# Patient Record
Sex: MALE | Race: BLACK OR AFRICAN AMERICAN | NOT HISPANIC OR LATINO | Employment: FULL TIME | URBAN - METROPOLITAN AREA
[De-identification: names, ages, dates, MRNs, and addresses within clinical notes are randomized per-mention and may not be internally consistent; named-entity substitution may affect disease eponyms.]

---

## 2021-01-15 ENCOUNTER — HOSPITAL ENCOUNTER (EMERGENCY)
Facility: HOSPITAL | Age: 31
Discharge: HOME | End: 2021-01-15
Attending: EMERGENCY MEDICINE
Payer: MEDICAID

## 2021-01-15 VITALS
HEIGHT: 74 IN | TEMPERATURE: 98 F | SYSTOLIC BLOOD PRESSURE: 139 MMHG | BODY MASS INDEX: 24 KG/M2 | WEIGHT: 187 LBS | DIASTOLIC BLOOD PRESSURE: 67 MMHG | OXYGEN SATURATION: 100 % | RESPIRATION RATE: 18 BRPM | HEART RATE: 87 BPM

## 2021-01-15 DIAGNOSIS — U07.1 COVID-19 VIRUS INFECTION: Primary | ICD-10-CM

## 2021-01-15 LAB
CTP QC/QA: YES
SARS-COV-2 RDRP RESP QL NAA+PROBE: POSITIVE

## 2021-01-15 PROCEDURE — 99283 EMERGENCY DEPT VISIT LOW MDM: CPT | Mod: CS,,, | Performed by: EMERGENCY MEDICINE

## 2021-01-15 PROCEDURE — U0002 COVID-19 LAB TEST NON-CDC: HCPCS | Performed by: EMERGENCY MEDICINE

## 2021-01-15 PROCEDURE — 99283 PR EMERGENCY DEPT VISIT,LEVEL III: ICD-10-PCS | Mod: CS,,, | Performed by: EMERGENCY MEDICINE

## 2021-01-15 PROCEDURE — 99283 EMERGENCY DEPT VISIT LOW MDM: CPT

## 2021-01-16 ENCOUNTER — TELEPHONE (OUTPATIENT)
Dept: ADMINISTRATIVE | Facility: CLINIC | Age: 31
End: 2021-01-16

## 2021-01-17 ENCOUNTER — TELEPHONE (OUTPATIENT)
Dept: ADMINISTRATIVE | Facility: CLINIC | Age: 31
End: 2021-01-17

## 2021-01-19 ENCOUNTER — INFUSION (OUTPATIENT)
Dept: INFECTIOUS DISEASES | Facility: HOSPITAL | Age: 31
End: 2021-01-19
Attending: EMERGENCY MEDICINE
Payer: MEDICAID

## 2021-01-19 VITALS
HEART RATE: 57 BPM | HEIGHT: 73 IN | OXYGEN SATURATION: 98 % | DIASTOLIC BLOOD PRESSURE: 80 MMHG | WEIGHT: 187 LBS | RESPIRATION RATE: 16 BRPM | TEMPERATURE: 99 F | BODY MASS INDEX: 24.78 KG/M2 | SYSTOLIC BLOOD PRESSURE: 136 MMHG

## 2021-01-19 DIAGNOSIS — U07.1 COVID-19 VIRUS INFECTION: ICD-10-CM

## 2021-01-19 PROCEDURE — 63600175 PHARM REV CODE 636 W HCPCS: Performed by: INTERNAL MEDICINE

## 2021-01-19 PROCEDURE — M0239 BAMLANIVIMAB-XXXX INFUSION: HCPCS | Performed by: INTERNAL MEDICINE

## 2021-01-19 PROCEDURE — 25000003 PHARM REV CODE 250: Performed by: INTERNAL MEDICINE

## 2021-01-19 RX ORDER — DIPHENHYDRAMINE HYDROCHLORIDE 50 MG/ML
25 INJECTION INTRAMUSCULAR; INTRAVENOUS ONCE AS NEEDED
Status: DISCONTINUED | OUTPATIENT
Start: 2021-01-19 | End: 2022-09-27

## 2021-01-19 RX ORDER — ALBUTEROL SULFATE 90 UG/1
2 AEROSOL, METERED RESPIRATORY (INHALATION)
Status: DISCONTINUED | OUTPATIENT
Start: 2021-01-19 | End: 2022-09-27

## 2021-01-19 RX ORDER — ONDANSETRON 4 MG/1
4 TABLET, ORALLY DISINTEGRATING ORAL ONCE AS NEEDED
Status: DISCONTINUED | OUTPATIENT
Start: 2021-01-19 | End: 2022-09-27

## 2021-01-19 RX ORDER — ACETAMINOPHEN 325 MG/1
650 TABLET ORAL ONCE AS NEEDED
Status: DISCONTINUED | OUTPATIENT
Start: 2021-01-19 | End: 2022-09-27

## 2021-01-19 RX ORDER — EPINEPHRINE 0.1 MG/ML
0.3 INJECTION INTRAVENOUS
Status: DISCONTINUED | OUTPATIENT
Start: 2021-01-19 | End: 2022-09-27

## 2021-01-19 RX ORDER — SODIUM CHLORIDE 0.9 % (FLUSH) 0.9 %
10 SYRINGE (ML) INJECTION
Status: DISCONTINUED | OUTPATIENT
Start: 2021-01-19 | End: 2022-09-27

## 2021-01-19 RX ADMIN — SODIUM CHLORIDE 700 MG: 0.9 INJECTION, SOLUTION INTRAVENOUS at 10:01

## 2021-02-03 ENCOUNTER — HOSPITAL ENCOUNTER (EMERGENCY)
Facility: HOSPITAL | Age: 31
Discharge: HOME | End: 2021-02-03
Attending: EMERGENCY MEDICINE
Payer: MEDICAID

## 2021-02-03 VITALS
RESPIRATION RATE: 18 BRPM | WEIGHT: 187 LBS | TEMPERATURE: 99 F | HEART RATE: 87 BPM | DIASTOLIC BLOOD PRESSURE: 81 MMHG | OXYGEN SATURATION: 100 % | HEIGHT: 74 IN | SYSTOLIC BLOOD PRESSURE: 134 MMHG | BODY MASS INDEX: 24 KG/M2

## 2021-02-03 DIAGNOSIS — Z76.89 RETURN TO WORK EXAM: Primary | ICD-10-CM

## 2021-02-03 PROCEDURE — 99283 EMERGENCY DEPT VISIT LOW MDM: CPT

## 2021-02-03 PROCEDURE — 99282 EMERGENCY DEPT VISIT SF MDM: CPT | Mod: CR,,, | Performed by: PHYSICIAN ASSISTANT

## 2021-02-03 PROCEDURE — 99282 PR EMERGENCY DEPT VISIT,LEVEL II: ICD-10-PCS | Mod: CR,,, | Performed by: PHYSICIAN ASSISTANT

## 2021-02-03 PROCEDURE — 86803 HEPATITIS C AB TEST: CPT

## 2021-02-03 PROCEDURE — 86703 HIV-1/HIV-2 1 RESULT ANTBDY: CPT

## 2021-02-04 LAB
HCV AB SERPL QL IA: NEGATIVE
HIV 1+2 AB+HIV1 P24 AG SERPL QL IA: NEGATIVE

## 2021-12-24 ENCOUNTER — HOSPITAL ENCOUNTER (EMERGENCY)
Facility: HOSPITAL | Age: 31
Discharge: HOME | End: 2021-12-24
Attending: EMERGENCY MEDICINE
Payer: MEDICAID

## 2021-12-24 VITALS
HEART RATE: 98 BPM | DIASTOLIC BLOOD PRESSURE: 56 MMHG | OXYGEN SATURATION: 98 % | RESPIRATION RATE: 19 BRPM | TEMPERATURE: 100 F | SYSTOLIC BLOOD PRESSURE: 127 MMHG

## 2021-12-24 DIAGNOSIS — R19.7 DIARRHEA, UNSPECIFIED TYPE: ICD-10-CM

## 2021-12-24 DIAGNOSIS — B34.9 VIRAL SYNDROME: Primary | ICD-10-CM

## 2021-12-24 LAB
ALBUMIN SERPL BCP-MCNC: 4.1 G/DL (ref 3.5–5.2)
ALP SERPL-CCNC: 81 U/L (ref 55–135)
ALT SERPL W/O P-5'-P-CCNC: 48 U/L (ref 10–44)
ANION GAP SERPL CALC-SCNC: 5 MMOL/L (ref 8–16)
AST SERPL-CCNC: 30 U/L (ref 10–40)
BASOPHILS # BLD AUTO: 0.03 K/UL (ref 0–0.2)
BASOPHILS NFR BLD: 0.4 % (ref 0–1.9)
BILIRUB SERPL-MCNC: 0.9 MG/DL (ref 0.1–1)
BILIRUB UR QL STRIP: NEGATIVE
BUN SERPL-MCNC: 9 MG/DL (ref 6–20)
CALCIUM SERPL-MCNC: 9.6 MG/DL (ref 8.7–10.5)
CHLORIDE SERPL-SCNC: 107 MMOL/L (ref 95–110)
CLARITY UR REFRACT.AUTO: CLEAR
CO2 SERPL-SCNC: 28 MMOL/L (ref 23–29)
COLOR UR AUTO: YELLOW
CREAT SERPL-MCNC: 1.2 MG/DL (ref 0.5–1.4)
CTP QC/QA: YES
CTP QC/QA: YES
DIFFERENTIAL METHOD: ABNORMAL
EOSINOPHIL # BLD AUTO: 0 K/UL (ref 0–0.5)
EOSINOPHIL NFR BLD: 0.5 % (ref 0–8)
ERYTHROCYTE [DISTWIDTH] IN BLOOD BY AUTOMATED COUNT: 12.7 % (ref 11.5–14.5)
EST. GFR  (AFRICAN AMERICAN): >60 ML/MIN/1.73 M^2
EST. GFR  (NON AFRICAN AMERICAN): >60 ML/MIN/1.73 M^2
GLUCOSE SERPL-MCNC: 91 MG/DL (ref 70–110)
GLUCOSE UR QL STRIP: NEGATIVE
HCT VFR BLD AUTO: 45 % (ref 40–54)
HGB BLD-MCNC: 14.9 G/DL (ref 14–18)
HGB UR QL STRIP: NEGATIVE
IMM GRANULOCYTES # BLD AUTO: 0.03 K/UL (ref 0–0.04)
IMM GRANULOCYTES NFR BLD AUTO: 0.4 % (ref 0–0.5)
KETONES UR QL STRIP: NEGATIVE
LEUKOCYTE ESTERASE UR QL STRIP: NEGATIVE
LIPASE SERPL-CCNC: 10 U/L (ref 4–60)
LYMPHOCYTES # BLD AUTO: 0.9 K/UL (ref 1–4.8)
LYMPHOCYTES NFR BLD: 10.8 % (ref 18–48)
MCH RBC QN AUTO: 30.5 PG (ref 27–31)
MCHC RBC AUTO-ENTMCNC: 33.1 G/DL (ref 32–36)
MCV RBC AUTO: 92 FL (ref 82–98)
MICROSCOPIC COMMENT: NORMAL
MONOCYTES # BLD AUTO: 0.6 K/UL (ref 0.3–1)
MONOCYTES NFR BLD: 7.7 % (ref 4–15)
NEUTROPHILS # BLD AUTO: 6.3 K/UL (ref 1.8–7.7)
NEUTROPHILS NFR BLD: 80.2 % (ref 38–73)
NITRITE UR QL STRIP: NEGATIVE
NRBC BLD-RTO: 0 /100 WBC
PH UR STRIP: 7 [PH] (ref 5–8)
PLATELET # BLD AUTO: 289 K/UL (ref 150–450)
PMV BLD AUTO: 10.5 FL (ref 9.2–12.9)
POC MOLECULAR INFLUENZA A AGN: NEGATIVE
POC MOLECULAR INFLUENZA B AGN: NEGATIVE
POTASSIUM SERPL-SCNC: 3.8 MMOL/L (ref 3.5–5.1)
PROT SERPL-MCNC: 7.4 G/DL (ref 6–8.4)
PROT UR QL STRIP: NEGATIVE
RBC # BLD AUTO: 4.89 M/UL (ref 4.6–6.2)
RBC #/AREA URNS AUTO: 1 /HPF (ref 0–4)
SARS-COV-2 RDRP RESP QL NAA+PROBE: NEGATIVE
SARS-COV-2 RNA RESP QL NAA+PROBE: DETECTED
SODIUM SERPL-SCNC: 140 MMOL/L (ref 136–145)
SP GR UR STRIP: 1.01 (ref 1–1.03)
URN SPEC COLLECT METH UR: NORMAL
WBC # BLD AUTO: 7.89 K/UL (ref 3.9–12.7)
WBC #/AREA URNS AUTO: 0 /HPF (ref 0–5)

## 2021-12-24 PROCEDURE — 96360 HYDRATION IV INFUSION INIT: CPT

## 2021-12-24 PROCEDURE — 81001 URINALYSIS AUTO W/SCOPE: CPT | Performed by: EMERGENCY MEDICINE

## 2021-12-24 PROCEDURE — 80053 COMPREHEN METABOLIC PANEL: CPT | Performed by: EMERGENCY MEDICINE

## 2021-12-24 PROCEDURE — 87502 INFLUENZA DNA AMP PROBE: CPT

## 2021-12-24 PROCEDURE — 99282 EMERGENCY DEPT VISIT SF MDM: CPT | Mod: CS,,, | Performed by: EMERGENCY MEDICINE

## 2021-12-24 PROCEDURE — U0005 INFEC AGEN DETEC AMPLI PROBE: HCPCS | Performed by: EMERGENCY MEDICINE

## 2021-12-24 PROCEDURE — 83690 ASSAY OF LIPASE: CPT | Performed by: EMERGENCY MEDICINE

## 2021-12-24 PROCEDURE — 85025 COMPLETE CBC W/AUTO DIFF WBC: CPT | Performed by: EMERGENCY MEDICINE

## 2021-12-24 PROCEDURE — 99282 PR EMERGENCY DEPT VISIT,LEVEL II: ICD-10-PCS | Mod: CS,,, | Performed by: EMERGENCY MEDICINE

## 2021-12-24 PROCEDURE — 25000003 PHARM REV CODE 250: Performed by: EMERGENCY MEDICINE

## 2021-12-24 PROCEDURE — 99284 EMERGENCY DEPT VISIT MOD MDM: CPT | Mod: 25

## 2021-12-24 PROCEDURE — U0002 COVID-19 LAB TEST NON-CDC: HCPCS | Performed by: EMERGENCY MEDICINE

## 2021-12-24 PROCEDURE — U0003 INFECTIOUS AGENT DETECTION BY NUCLEIC ACID (DNA OR RNA); SEVERE ACUTE RESPIRATORY SYNDROME CORONAVIRUS 2 (SARS-COV-2) (CORONAVIRUS DISEASE [COVID-19]), AMPLIFIED PROBE TECHNIQUE, MAKING USE OF HIGH THROUGHPUT TECHNOLOGIES AS DESCRIBED BY CMS-2020-01-R: HCPCS | Performed by: EMERGENCY MEDICINE

## 2021-12-24 RX ORDER — ACETAMINOPHEN 325 MG/1
650 TABLET ORAL
Status: COMPLETED | OUTPATIENT
Start: 2021-12-24 | End: 2021-12-24

## 2021-12-24 RX ADMIN — ACETAMINOPHEN 650 MG: 325 TABLET ORAL at 03:12

## 2021-12-24 RX ADMIN — SODIUM CHLORIDE 1000 ML: 0.9 INJECTION, SOLUTION INTRAVENOUS at 03:12

## 2021-12-24 NOTE — ED PROVIDER NOTES
CC: COVID-19 Concerns      History provided by:   Patient    HPI: Mal Wilburn is a 31 y.o. year old male who presents to the ED for body aches, sore throat, cough, diarrhea that started last night and got worse today.  He had chills and body aches, denies any fever, shortness of breath, chest pain, hematuria, penile discharge,  swelling or erythema, lower extremity swelling.  He reports 2 episodes of diarrhea today, nonbloody    His child was sick a few days ago with viral syndrome  He also ate nachos yesterday, his wife ate the same thing with no diarrhea or fever    Past medical history of asthma on albuterol p.r.n. however has not been using the medication in years  No known allergies to medications  No alcohol or smoking      No past medical history on file.  No past surgical history on file.  No family history on file.  Current Facility-Administered Medications on File Prior to Encounter   Medication Dose Route Frequency Provider Last Rate Last Admin    acetaminophen tablet 650 mg  650 mg Oral Once PRN Jonathon Harp MD        albuterol inhaler 2 puff  2 puff Inhalation Q20 Min PRN Jonathon Harp MD        diphenhydrAMINE injection 25 mg  25 mg Intravenous Once PRN Jonathon Harp MD        EPINEPHrine 0.1 mg/mL injection 0.3 mg  0.3 mg Intramuscular PRN Jonathon Harp MD        methylPREDNISolone sodium succinate injection 40 mg  40 mg Intravenous Once PRN Jonathon Harp MD        ondansetron disintegrating tablet 4 mg  4 mg Oral Once PRN Jonathon Harp MD        sodium chloride 0.9% 500 mL flush bag   Intravenous PRN Jonathon Harp MD        sodium chloride 0.9% flush 10 mL  10 mL Intravenous PRN Jonathon Harp MD         Current Outpatient Medications on File Prior to Encounter   Medication Sig Dispense Refill    pulse oximeter (PULSE OXIMETER) device by Apply Externally route 2 (two) times a day. Use twice daily at 8 AM and 3 PM and record the value in [employer]  as directed. 1 each 0     Patient has no known allergies.  Social History     Socioeconomic History    Marital status: Unknown       ROS:     Constitutional : neg for fever, neg for weakness  HENT neg for head injury, pos for sore throat  Eyes: neg for visual changes, neg for eye pain  Resp neg for SOB, pos for cough  Cardiac  neg for chest pain, neg for palpitations  GI pos for abd pain, neg for nausea, neg for vomiting, positive for diarrhea   neg for urinary changes  Neuro neg for focal weakness or numbness  Skin neg for skin rash  MSK:  Myalgia  ALL: Patient has no known allergies.    PHYSICAL EXAM:  Vitals:    12/24/21 1647   BP: (!) 127/56   Pulse: 98   Resp: 19   Temp: 99.8 °F (37.7 °C)     VS: triage VS reviewed    general: comfortable, in no acute distress, pleasant, well nourished  HENT: neck symmetric, trachea midline, post oropharynx symmetrical no edema no exudate  Eyes: no conjunctival injection and symmetrical eyelids  CV:tachycardic regular, no LE edema  Resp: comfortable breathing, speaks in full sentences, CTAB, no wheezing, no crackles, no ronchi  ABD:  soft, ND, no masses, + normal BS, NT  Renal: No CVAT  Neuro: AAO x 3, 5/5 strength x 4 extremities, sensation intact, face symmetric, speech normal  MSK: NC/AT, extremities w/out deformity   Skin: warm, dry            DATA & INTERVENTIONS:    LABS reviewed:  Labs Reviewed   COMPREHENSIVE METABOLIC PANEL - Abnormal; Notable for the following components:       Result Value    ALT 48 (*)     Anion Gap 5 (*)     All other components within normal limits   CBC W/ AUTO DIFFERENTIAL - Abnormal; Notable for the following components:    Lymph # 0.9 (*)     Gran % 80.2 (*)     Lymph % 10.8 (*)     All other components within normal limits   SARS-COV-2 (COVID-19) QUALITATIVE PCR - Abnormal; Notable for the following components:    SARS-CoV2 (COVID-19) Qualitative PCR Detected (*)     All other components within normal limits    Narrative:     Is the  patient symptomatic?->Yes  Is this needed for pre-procedure or pre-op testing?->No   LIPASE   URINALYSIS, REFLEX TO URINE CULTURE    Narrative:     Specimen Source->Urine   URINALYSIS MICROSCOPIC    Narrative:     Specimen Source->Urine   SARS-COV-2 RDRP GENE    Narrative:     This test utilizes isothermal nucleic acid amplification   technology to detect the SARS-CoV-2 RdRp nucleic acid segment.   The analytical sensitivity (limit of detection) is 125 genome   equivalents/mL.   A POSITIVE result implies infection with the SARS-CoV-2 virus;   the patient is presumed to be contagious.     A NEGATIVE result means that SARS-CoV-2 nucleic acids are not   present above the limit of detection. A NEGATIVE result should be   treated as presumptive. It does not rule out the possibility of   COVID-19 and should not be the sole basis for treatment decisions.   If COVID-19 is strongly suspected based on clinical and exposure   history, re-testing using an alternate molecular assay should be   considered.   This test is only for use under the Food and Drug   Administration s Emergency Use Authorization (EUA).   Commercial kits are provided by [employer].   Performance characteristics of the EUA have been independently   verified by [facility] Department of   Pathology and Laboratory Medicine.   _________________________________________________________________   The authorized Fact Sheet for Healthcare Providers and the authorized Fact   Sheet for Patients of the ID NOW COVID-19 are available on the FDA   website:     https://www.fda.gov/media/477413/download  https://www.fda.gov/media/028725/download       POCT INFLUENZA A/B MOLECULAR       RADIOLOGY reviewed:  Imaging Results    None         MEDICATIONS/FLUIDS:  Medications   acetaminophen tablet 650 mg (650 mg Oral Given 12/24/21 1520)   sodium chloride 0.9% bolus 1,000 mL (0 mLs Intravenous Stopped 12/24/21 1615)         MDM:  Mal Wilburn is a 31 y.o.  year old male who presents to the ED for body aches, chills, sore throat, cough, nonbloody diarrhea.  Symptoms suggestive of viral syndrome.  Patient tachycardic 115 in triage temperature a 100° F. point of care COVID negative patient had COVID last year in January, not vaccinated for flu or COVID.    Will test for flu also will send PCR testing for COVID in the light of increase recent cases of COVID  Unlikely appendicitis pancreatitis or cholecystitis, he does not localize pain on abdominal exam      Labs ordered and reviewed:   CBC normal white count, hemoglobin and platelets  CMP slight elevation in ALT of 48 otherwise no abnormalities  Lipase normal  UA negative for blood, nitrites and leukocytes  Point of COVID negative  Influenza negative  PCR COVID pending at time of discharge  Medication given in the ED:  Tylenol, IV fluids    PCR covid pending at discharge  Rpt vitals improved. Patient would like to go home. Stable for discharge    2:26 PM 12/25/2021  Patient called with the result of the Covid PCR test. Advised to start isolation, symptomatic management was also discussed    IMPRESSION:  1.) Viral syndrome  2.) Covid 19      Dispo: Discharge    Critical Care Time: N/A       Marlene Santana MD  12/25/21 3466

## 2021-12-24 NOTE — DISCHARGE INSTRUCTIONS
You can take tylenol and /or ibuprofen as needed for pain and fever  You can take robitussin as needed for cough  Return to the ED for chest pain, shortness of breath, confusion, inability to keep food and fluids down, oxygen saturation of 93 or below (if you have a pulse oximeter to monitor your oxygen level at home), worsening abdominal pain, or any other concerns

## 2021-12-24 NOTE — ED NOTES
Mal Wilburn, a 31 y.o. male presents to the ED w/ complaint of covid concerns. Pt reports SOB and nausea/abdominal pain. Denies chest pain.     Triage note:  Chief Complaint   Patient presents with    COVID-19 Concerns     Review of patient's allergies indicates:  No Known Allergies  No past medical history on file.

## 2021-12-24 NOTE — ED PROVIDER NOTES
Encounter Date: 12/24/2021       History     Chief Complaint   Patient presents with    COVID-19 Concerns     The history is provided by medical records and the patient. No  was used.      Mal Wilburn is a 31 y.o. male with no reported medical history presenting to the ED with the chief complaint of COVID-19 concerns.       Review of patient's allergies indicates:  No Known Allergies  No past medical history on file.  No past surgical history on file.  No family history on file.     Review of Systems    Physical Exam     Initial Vitals [12/24/21 1400]   BP Pulse Resp Temp SpO2   (!) 167/78 (!) 115 15 100 °F (37.8 °C) 99 %      MAP       --         Physical Exam    ED Course   Procedures  Labs Reviewed   SARS-COV-2 RDRP GENE    Narrative:     This test utilizes isothermal nucleic acid amplification   technology to detect the SARS-CoV-2 RdRp nucleic acid segment.   The analytical sensitivity (limit of detection) is 125 genome   equivalents/mL.   A POSITIVE result implies infection with the SARS-CoV-2 virus;   the patient is presumed to be contagious.     A NEGATIVE result means that SARS-CoV-2 nucleic acids are not   present above the limit of detection. A NEGATIVE result should be   treated as presumptive. It does not rule out the possibility of   COVID-19 and should not be the sole basis for treatment decisions.   If COVID-19 is strongly suspected based on clinical and exposure   history, re-testing using an alternate molecular assay should be   considered.   This test is only for use under the Food and Drug   Administration s Emergency Use Authorization (EUA).   Commercial kits are provided by [employer].   Performance characteristics of the EUA have been independently   verified by [facility] Department of   Pathology and Laboratory Medicine.   _________________________________________________________________   The authorized Fact Sheet for Healthcare Providers and the  "authorized Fact   Sheet for Patients of the ID NOW COVID-19 are available on the FDA   website:     https://www.fda.gov/media/643395/download  https://www.fda.gov/media/023986/download              Imaging Results    None          Medications - No data to display                       Clinical Impression:    ***Please document a Clinical Impression and click the "Refresh" button to refresh your note and automatically pull in before signing.***           "

## 2022-04-18 ENCOUNTER — OCCUPATIONAL HEALTH (OUTPATIENT)
Dept: URGENT CARE | Facility: CLINIC | Age: 32
End: 2022-04-18

## 2022-04-18 DIAGNOSIS — Z02.83 ENCOUNTER FOR DRUG SCREENING: Primary | ICD-10-CM

## 2022-04-18 LAB
CTP QC/QA: YES
POC 10 PANEL DRUG SCREEN: ABNORMAL

## 2022-04-18 PROCEDURE — 80305 DRUG TEST PRSMV DIR OPT OBS: CPT | Mod: S$GLB,,, | Performed by: EMERGENCY MEDICINE

## 2022-04-18 PROCEDURE — 80305 OOH NON-DOT DRUG SCREEN: ICD-10-PCS | Mod: S$GLB,,, | Performed by: EMERGENCY MEDICINE

## 2022-04-25 ENCOUNTER — TELEPHONE (OUTPATIENT)
Dept: URGENT CARE | Facility: CLINIC | Age: 32
End: 2022-04-25
Payer: MEDICAID

## 2022-09-27 ENCOUNTER — OFFICE VISIT (OUTPATIENT)
Dept: FAMILY MEDICINE | Facility: HOSPITAL | Age: 32
End: 2022-09-27
Payer: MEDICAID

## 2022-09-27 VITALS
DIASTOLIC BLOOD PRESSURE: 82 MMHG | HEIGHT: 74 IN | HEART RATE: 72 BPM | BODY MASS INDEX: 24.01 KG/M2 | SYSTOLIC BLOOD PRESSURE: 141 MMHG

## 2022-09-27 DIAGNOSIS — F45.9 ERECTILE DYSFUNCTION DUE TO PSYCHOPHYSIOLOGIC DISORDER: Primary | ICD-10-CM

## 2022-09-27 DIAGNOSIS — N52.1 ERECTILE DYSFUNCTION DUE TO PSYCHOPHYSIOLOGIC DISORDER: Primary | ICD-10-CM

## 2022-09-27 DIAGNOSIS — Z00.00 ENCOUNTER FOR MEDICAL EXAMINATION TO ESTABLISH CARE: ICD-10-CM

## 2022-09-27 PROCEDURE — 99213 OFFICE O/P EST LOW 20 MIN: CPT

## 2022-09-27 NOTE — PROGRESS NOTES
[facility]  History & Physical    SUBJECTIVE:     Chief Complaint:   Chief Complaint   Patient presents with    Establish Care       History of Present Illness:  31 y.o. male who  has no past medical history on file. presents to clinic today for establishing care.     #Erectile dysfunction  - Patient states over the last few weeks he has had difficulty achieving an erection with a new female partner. Denies this problem in the past. Endorses nocturnal penile tumescence, erections with masturbation and with other female partners. Denies dysuria, rash, hematuria, penile discharge, testicular pain.     Allergies:  Review of patient's allergies indicates:  No Known Allergies    Home Medications: none      No past medical history on file.  No past surgical history on file.  No family history on file.  Social History     Tobacco Use    Smoking status: Never    Smokeless tobacco: Never        ROS     OBJECTIVE:     Vital Signs:  Pulse: 72 (09/27/22 1432)  BP: (!) 141/82 (09/27/22 1432)    Physical Exam  Vitals and nursing note reviewed.   Constitutional:       General: He is not in acute distress.     Appearance: Normal appearance.   HENT:      Head: Normocephalic and atraumatic.      Right Ear: External ear normal.      Left Ear: External ear normal.      Nose: Nose normal.      Mouth/Throat:      Mouth: Mucous membranes are moist.   Eyes:      Extraocular Movements: Extraocular movements intact.      Conjunctiva/sclera: Conjunctivae normal.      Pupils: Pupils are equal, round, and reactive to light.   Cardiovascular:      Rate and Rhythm: Normal rate and regular rhythm.      Pulses: Normal pulses.      Heart sounds: Normal heart sounds. No murmur heard.  Pulmonary:      Effort: Pulmonary effort is normal.      Breath sounds: Normal breath sounds.   Abdominal:      General: Bowel sounds are normal.      Palpations: Abdomen is soft.      Tenderness: There is no abdominal tenderness.   Genitourinary:     Comments:  Patient deferred    Musculoskeletal:         General: Normal range of motion.      Cervical back: Normal range of motion and neck supple.   Skin:     General: Skin is warm and dry.      Capillary Refill: Capillary refill takes less than 2 seconds.   Neurological:      General: No focal deficit present.      Mental Status: He is alert and oriented to person, place, and time.   Psychiatric:         Mood and Affect: Mood normal.         Behavior: Behavior normal.       A/P:  Mal was seen today for establish care.    Given nocturnal penile tumescence, erections with masturbation and other partners, lack of history of ED, smoking hx, obesity, or other attributable causes, his ED is likely psychogenic and related to this particular partner. Counseling was offered but denied at this time. Told to fu with the clinic if pain, swelling, discharge, or worsening of the condition occur. Given patient's social history, BMI, normal vitals, do not believe lipid or diabetes screening to be appropriate at this time. Immunizations were offered but denied by the patient.     Diagnoses and all orders for this visit:    Erectile dysfunction due to psychophysiologic disorder    Encounter for medical examination to establish care      Dion Licona DO  [facility], PGY-1